# Patient Record
Sex: MALE | Race: WHITE | NOT HISPANIC OR LATINO | Employment: FULL TIME | URBAN - NONMETROPOLITAN AREA
[De-identification: names, ages, dates, MRNs, and addresses within clinical notes are randomized per-mention and may not be internally consistent; named-entity substitution may affect disease eponyms.]

---

## 2017-01-17 ENCOUNTER — OFFICE VISIT (OUTPATIENT)
Dept: ORTHOPEDIC SURGERY | Facility: CLINIC | Age: 52
End: 2017-01-17

## 2017-01-17 DIAGNOSIS — M75.80 ROTATOR CUFF TENDONITIS, UNSPECIFIED LATERALITY: ICD-10-CM

## 2017-01-17 DIAGNOSIS — M75.41 IMPINGEMENT SYNDROME OF RIGHT SHOULDER: Primary | ICD-10-CM

## 2017-01-17 PROCEDURE — 20610 DRAIN/INJ JOINT/BURSA W/O US: CPT | Performed by: ORTHOPAEDIC SURGERY

## 2017-01-17 RX ADMIN — METHYLPREDNISOLONE ACETATE 160 MG: 80 INJECTION, SUSPENSION INTRA-ARTICULAR; INTRALESIONAL; INTRAMUSCULAR; SOFT TISSUE at 15:52

## 2017-01-17 RX ADMIN — METHYLPREDNISOLONE ACETATE 160 MG: 80 INJECTION, SUSPENSION INTRA-ARTICULAR; INTRALESIONAL; INTRAMUSCULAR; SOFT TISSUE at 15:49

## 2017-01-17 RX ADMIN — LIDOCAINE HYDROCHLORIDE 2 ML: 10 INJECTION, SOLUTION INFILTRATION; PERINEURAL at 15:52

## 2017-01-17 RX ADMIN — LIDOCAINE HYDROCHLORIDE 2 ML: 10 INJECTION, SOLUTION INFILTRATION; PERINEURAL at 15:49

## 2017-01-17 NOTE — MR AVS SNAPSHOT
Vikram Gusman JrToby   2017 3:30 PM   Office Visit    Dept Phone:  [phone]   Encounter #:  [id number]    Provider:  Ender Lau MD   Department:  [facility]                Your Full Care Plan              Your Updated Medication List          This list is accurate as of: 17  3:57 PM.  Always use your most recent med list.                ondansetron 8 MG tablet   Commonly known as:  ZOFRAN       promethazine 25 MG tablet   Commonly known as:  PHENERGAN               We Performed the Following     Large Joint Arthrocentesis     Large Joint Arthrocentesis       You Were Diagnosed With        Codes Comments    Impingement syndrome of right shoulder    -  Primary ICD-10-CM: M75.41  ICD-9-CM: 726.2     Rotator cuff tendonitis, unspecified laterality     ICD-10-CM: M75.80  ICD-9-CM: 726.10       Instructions     None    Patient Instructions History      Upcoming Appointments     Visit Type Date Time Department    FOLLOW UP 2017  3:30 PM [facility]    INJECTION 2017  4:00 PM [facility]      MyChart Signup     [facility] [employer] allows you to send messages to your doctor, view your test results, renew your prescriptions, schedule appointments, and more. To sign up, go to [employer] and click on the Sign Up Now link in the New User? box. Enter your [employer] Activation Code exactly as it appears below along with the last four digits of your Social Security Number and your Date of Birth () to complete the sign-up process. If you do not sign up before the expiration date, you must request a new code.    [employer] Activation Code: [email or web address]  Expires: 2017  3:57 PM    If you have questions, you can email [email or web address] or call [phone] to talk to our [employer] staff. Remember, [employer] is NOT to be used for urgent needs. For medical emergencies, dial 911.               Other Info from Your Visit           Your Appointments     Apr 18, 2017  4:00 PM EDT   Injection with Ender Lau MD   [facility] (--)    [address] [place], [place] [address] [phone]              Allergies     No Known Allergies      Reason for Visit     Right Shoulder - Injections           Vital Signs     Smoking Status                   Never Smoker           Problems and Diagnoses Noted     Impingement syndrome of right shoulder    Rotator cuff tendinitis

## 2017-01-17 NOTE — PROGRESS NOTES
Chief Complaint   Patient presents with   • Right Shoulder - Injections           HPI the patient is here today for a follow up of his right shoulder.  Patient has increasing pain and discomfort on the anterior and the posterior aspect of the shoulder.  He has signs of impingement.  Difficulty with cross body activities.  Difficulty in reaching into the overhead position.  He has a lot of nighttime pain and discomfort and the pain is interrupting his sleep patterns.  He tries his best to avoid repetitive loading of the shoulder and seems to doing a little bit better in terms of his workplace repetitive motion to the shoulder.        There were no vitals filed for this visit.        Review of Systems   All other systems reviewed and are negative.          Physical Exam   Constitutional: He appears well-developed.   HENT:   Head: Normocephalic.   Nose: Nose normal.   Eyes: Conjunctivae are normal. Pupils are equal, round, and reactive to light.   Neck: Normal range of motion.   Cardiovascular: Normal rate and intact distal pulses.    Pulmonary/Chest: Effort normal and breath sounds normal.   Abdominal: Soft. Bowel sounds are normal.   Musculoskeletal: Normal range of motion.   Neurological: He is alert. He has normal reflexes.   Skin: Skin is warm.   Psychiatric: He has a normal mood and affect. His behavior is normal. Judgment and thought content normal.   Vitals reviewed.          Joint/Body Part Specific Exam:  right Shoulder. Patient has signs of impingement with internal and external rotation. There is a lot of pain and tenderness for the patient over the subcromial bursa. Paige’s sign is positive. Neer sign is positive. Forward flexion is 0-120° degrees, abduction is 0-30° degrees, external rotation is 0 to 30° degrees. Rotator cuff function is fairly well preserved except for the impingement at 90 degrees. Apprehension sign is negative. Axillary nerve function is well preserved. Radial artery pulses are  palpable. There is no evidence of multidirectional instability. Sulcus sign is negative. Drop arm sign is negative. The patient has some ill-defined tenderness over the greater tuberosity of the humerus. The pain level is 5.      X-RAY Report:        Diagnostics:        Vikram was seen today for injections.    Diagnoses and all orders for this visit:    Impingement syndrome of right shoulder    Rotator cuff tendonitis, unspecified laterality            Large Joint Arthrocentesis  Date/Time: 1/17/2017 3:49 PM  Consent given by: patient  Site marked: site marked  Timeout: Immediately prior to procedure a time out was called to verify the correct patient, procedure, equipment, support staff and site/side marked as required   Supporting Documentation  Indications: pain   Procedure Details  Location: shoulder - R subacromial bursa  Preparation: Patient was prepped and draped in the usual sterile fashion  Needle size: 25 G  Approach: anteromedial  Medications administered: 2 mL lidocaine 1 %; 160 mg methylPREDNISolone acetate 80 MG/ML  Patient tolerance: patient tolerated the procedure well with no immediate complications    Large Joint Arthrocentesis  Date/Time: 1/17/2017 3:52 PM  Consent given by: patient  Site marked: site marked  Timeout: Immediately prior to procedure a time out was called to verify the correct patient, procedure, equipment, support staff and site/side marked as required   Supporting Documentation  Indications: pain   Procedure Details  Location: shoulder - R glenohumeral  Preparation: Patient was prepped and draped in the usual sterile fashion  Needle size: 25 G  Approach: anteromedial  Medications administered: 2 mL lidocaine 1 %; 160 mg methylPREDNISolone acetate 80 MG/ML  Patient tolerance: patient tolerated the procedure well with no immediate complications              Plan:  Injected the patient's right shoulder into the glenohumeral joint from an anterior approach.  This injection was a  steroid injection.  Injected the patient's right subacromial space from a posterior approach with the steroid.  Home-based exercise program.  Stretching exercises to prevent a frozen shoulder.  Ice to the shoulder.  Ibuprofen 600 mg tab 1 by mouth twice a day when necessary pain.  Avoid repetitive loading.  Nonoperative care discussed with the patient.  Follow-up in my office in 3 months.

## 2017-01-20 RX ORDER — METHYLPREDNISOLONE ACETATE 80 MG/ML
160 INJECTION, SUSPENSION INTRA-ARTICULAR; INTRALESIONAL; INTRAMUSCULAR; SOFT TISSUE
Status: COMPLETED | OUTPATIENT
Start: 2017-01-17 | End: 2017-01-17

## 2017-01-20 RX ORDER — LIDOCAINE HYDROCHLORIDE 10 MG/ML
2 INJECTION, SOLUTION INFILTRATION; PERINEURAL
Status: COMPLETED | OUTPATIENT
Start: 2017-01-17 | End: 2017-01-17

## 2017-04-18 ENCOUNTER — CLINICAL SUPPORT (OUTPATIENT)
Dept: ORTHOPEDIC SURGERY | Facility: CLINIC | Age: 52
End: 2017-04-18

## 2017-04-18 DIAGNOSIS — M75.81 ROTATOR CUFF TENDONITIS, RIGHT: Primary | ICD-10-CM

## 2017-04-18 DIAGNOSIS — M75.41 IMPINGEMENT SYNDROME OF RIGHT SHOULDER: ICD-10-CM

## 2017-04-18 PROCEDURE — 99213 OFFICE O/P EST LOW 20 MIN: CPT | Performed by: ORTHOPAEDIC SURGERY

## 2017-04-18 NOTE — PROGRESS NOTES
Chief Complaint   Patient presents with   • Right Shoulder - Follow-up   Patient is here today with right shoulder pain. He is not sure if he wants injection in shoulder or not.        HPI  Patient has some pain and discomfort on the lateral aspect of the shoulder. There is some radiation of pain into the scapula. The previous visit when he had an injection of steroid around the shoulder helped him tremendously. At this point he feels like his symptoms are stable and he can get by with anti-inflammatory medication and really does not want a steroid injection. I have encouraged him to hold off the injection when it is not really required because it is not a prophylactic injection; it helps more with the pain and discomfort.    Patient states he is functioning well. He tries to avoid repetitive lowering of the upper extremity. He does use topical analgesics and local application of moist heat which tends to help his symptoms. He is not Interested in surgical intervention. In fact, at this point he is doing quite well and the steroid injections are helping him tremendously.            There were no vitals filed for this visit.        Review of Systems   All other systems reviewed and are negative.          Physical Exam   Constitutional: He is oriented to person, place, and time. He appears well-developed.   HENT:   Head: Normocephalic.   Eyes: Pupils are equal, round, and reactive to light.   Neck: Normal range of motion.   Cardiovascular: Normal rate and regular rhythm.    Pulmonary/Chest: Effort normal and breath sounds normal.   Abdominal: Soft.   Musculoskeletal: Normal range of motion. He exhibits edema. He exhibits no deformity.   Neurological: He is alert and oriented to person, place, and time. He has normal reflexes.   Skin: Skin is warm and dry.   Psychiatric: He has a normal mood and affect. His behavior is normal. Judgment and thought content normal.   Nursing note and vitals reviewed.          Joint/Body  Part Specific Exam:  right Shoulder. Patient has signs of impingement with internal and external rotation. There is a lot of pain and tenderness for the patient over the subcromial bursa. Paige’s sign is positive. Neer sign is positive. Forward flexion is 0-130 degrees, abduction is 0-130 degrees, external rotation is 0-40 degrees. Rotator cuff function is fairly well preserved except for the impingement at 90 degrees. Apprehension sign is negative. Axillary nerve function is well preserved. Radial artery pulses are palpable. There is no evidence of multidirectional instability. Sulcus sign is negative. Drop arm sign is negative. The patient has some ill-defined tenderness over the greater tuberosity of the humerus. The pain level is 2.      X-RAY Report:        Diagnostics:        Vikram was seen today for follow-up.    Diagnoses and all orders for this visit:    Rotator cuff tendonitis, right    Impingement syndrome of right shoulder          Procedures        Plan:   Patient is doing very well with his previous injection of steroid around the shoulder and, therefore, does not want one today.    Tablet ibuprofen 600 mg tab 1 p.o. b.i.d. p.r.n. pain and discomfort.     Reviewed his home-based exercise program including an overhead pulley to prevent frozen shoulder.    Calcium with vitamin D for bone health.    Avoid repetitive lifting and lowering with the upper extremity, especially the shoulder, to minimize the possibility of aggravation of his rotator cuff tendinitis.    There is no indication for surgical intervention at this point in time.    Follow up in 3 months.    It is okay for him to apply topical Pennsaid for anti-inflammatory purposes.

## 2017-07-20 ENCOUNTER — CLINICAL SUPPORT (OUTPATIENT)
Dept: ORTHOPEDIC SURGERY | Facility: CLINIC | Age: 52
End: 2017-07-20

## 2017-07-20 DIAGNOSIS — M75.81 ROTATOR CUFF TENDONITIS, RIGHT: Primary | ICD-10-CM

## 2017-07-20 DIAGNOSIS — M75.41 IMPINGEMENT SYNDROME OF RIGHT SHOULDER: ICD-10-CM

## 2017-07-20 PROCEDURE — 99213 OFFICE O/P EST LOW 20 MIN: CPT | Performed by: ORTHOPAEDIC SURGERY

## 2017-07-20 PROCEDURE — 20610 DRAIN/INJ JOINT/BURSA W/O US: CPT | Performed by: ORTHOPAEDIC SURGERY

## 2017-07-20 RX ADMIN — METHYLPREDNISOLONE ACETATE 160 MG: 80 INJECTION, SUSPENSION INTRA-ARTICULAR; INTRALESIONAL; INTRAMUSCULAR; SOFT TISSUE at 08:25

## 2017-07-20 RX ADMIN — LIDOCAINE HYDROCHLORIDE 2 ML: 10 INJECTION, SOLUTION INFILTRATION; PERINEURAL at 08:25

## 2017-08-05 RX ORDER — METHYLPREDNISOLONE ACETATE 80 MG/ML
160 INJECTION, SUSPENSION INTRA-ARTICULAR; INTRALESIONAL; INTRAMUSCULAR; SOFT TISSUE
Status: COMPLETED | OUTPATIENT
Start: 2017-07-20 | End: 2017-07-20

## 2017-08-05 RX ORDER — LIDOCAINE HYDROCHLORIDE 10 MG/ML
2 INJECTION, SOLUTION INFILTRATION; PERINEURAL
Status: COMPLETED | OUTPATIENT
Start: 2017-07-20 | End: 2017-07-20